# Patient Record
Sex: FEMALE | Race: WHITE | NOT HISPANIC OR LATINO | URBAN - METROPOLITAN AREA
[De-identification: names, ages, dates, MRNs, and addresses within clinical notes are randomized per-mention and may not be internally consistent; named-entity substitution may affect disease eponyms.]

---

## 2017-01-04 ENCOUNTER — GENERIC CONVERSION - ENCOUNTER (OUTPATIENT)
Dept: OTHER | Facility: OTHER | Age: 57
End: 2017-01-04

## 2018-01-09 NOTE — MISCELLANEOUS
History of Present Illness  TCM Communication [facility]: The patient is being contacted for follow-up after hospitalization and 1/4/17 BHUPINDER Mcmahon LPN  She was hospitalized at and [facility]  The date of admission: 12/31/16, date of discharge: 1/3/17  She was discharged to home  Medications reviewed and updated today  She scheduled a follow up appointment  Symptoms: swelling and swelling location Left leg, but no fever, no weakness, no dizziness, no headache, no fatigue, no cough, no shortness of breath, no chest pain, no back pain on left side, no back pain on right side, no arm pain left side, no arm pain on right side, no leg pain on left side, no leg pain on right side, no upper abdominal pain, no middle abdominal pain, no lower abdominal pain, no rash:, no anorexia, no nausea, no vomiting, no loose stools, no constipation, no pain with urinating, no incisional pain and no wound drainage  Counseling was provided to the patient  Communication performed and completed by BHUPINDER Mcmahon LPN 8/7/17      Active Problems    1  Abnormal electrocardiogram (794 31) (R94 31)   2  Adenocarcinoma of breast, unspecified laterality (174 9) (C50 919)   3  Encounter for screening mammogram for malignant neoplasm of breast (V76 12)   (Z12 31)   4  Esophageal reflux (530 81) (K21 9)   5  Generalized anxiety disorder (300 02) (F41 1)   6  Insomnia (780 52) (G47 00)   7  Iron deficiency anemia (280 9) (D50 9)   8  Nicotine dependence (305 1) (F17 200)   9  Pericardial Disease (423 8)   10  Visit for pre-operative examination (V72 84) (Z01 818)   11  Vitamin D deficiency (268 9) (E55 9)    Past Medical History    1  Acute upper respiratory infection (465 9) (J06 9)   2  History of Benign paroxysmal vertigo, unspecified laterality (386 11) (H81 10)   3  History of Depression (311) (F32 9)   4  Encounter for routine pelvic examination (V72 31) (Z01 419)   5   Exposure to potentially hazardous body fluids (V15 85) (Z77 21) 6  History of acute bronchitis (V12 69) (Z87 09)   7  History of carbuncle of skin and subcutaneous tissue (V13 3) (Z87 2)   8  History of nicotine dependence (V15 82) (Z87 891)   9  History of syncope (V15 89) (Z87 898)   10  History of urinary incontinence (V13 09) (Z87 898)   11  History of Postprocedural state (V45 89) (Z98 890)   12  History of Restless legs syndrome (333 94) (G25 81)   13  Well adult on routine health check (V70 0) (Z00 00)    Surgical History    1  History of Administrative Evaluation Services (V70 3)   2  History of Breast Surgery Lumpectomy   3  History of Cervix Cryosurgery   4  History of Total Abdominal Hysterectomy With Removal Of Both Ovaries    Social History    · Former smoker (B00 78) (T86 321)    Current Meds   1  Chantix Continuing Month Randal 1 MG Oral Tablet; TAKE AS DIRECTED PER PACKAGE   INSTRUCTIONS; Therapy: 60Thk9032 to (Last Rx:26Pvt5446)  Requested for: 28Duc8183 Ordered   2  Multi-Vitamin TABS; 1 Every Day; Therapy: 74HYT4451 to  Requested for: 94MIA8529 Recorded   3  Omega 3 120-180 MG CAPS; Therapy: 15Bxo4898 to  Requested for: 73FDI8593 Recorded   4  Vitamin B-12 1000 MCG Oral Tablet; 1 Every Day; Therapy: 50HGU3608 to  Requested for: 53BKN6908 Recorded   5  Vitamin D 400 UNIT TABS; 1000 IU BID; Therapy: 26Igs2141 to  Requested for: 75FMN4699 Recorded    Allergies    1   Wellbutrin XL TB24    Future Appointments    Date/Time Provider Specialty Site   01/09/2017 10:00 AM Oswaldo Aparicio DO Family Medicine [facility]     Signatures   Electronically signed by : Clemente Ruvalcaba DO; Jan 4 2017 11:34AM EST                       (Author)